# Patient Record
Sex: FEMALE | Race: WHITE | NOT HISPANIC OR LATINO | URBAN - METROPOLITAN AREA
[De-identification: names, ages, dates, MRNs, and addresses within clinical notes are randomized per-mention and may not be internally consistent; named-entity substitution may affect disease eponyms.]

---

## 2018-12-03 ENCOUNTER — APPOINTMENT (RX ONLY)
Dept: URBAN - METROPOLITAN AREA CLINIC 44 | Facility: CLINIC | Age: 23
Setting detail: DERMATOLOGY
End: 2018-12-03

## 2018-12-03 ENCOUNTER — APPOINTMENT (RX ONLY)
Dept: URBAN - METROPOLITAN AREA CLINIC 45 | Facility: CLINIC | Age: 23
Setting detail: DERMATOLOGY
End: 2018-12-03

## 2018-12-03 DIAGNOSIS — L0391 CELLULITIS AND ABSCESS OF UNSPECIFIED SITES: ICD-10-CM

## 2018-12-03 DIAGNOSIS — L0390 CELLULITIS AND ABSCESS OF UNSPECIFIED SITES: ICD-10-CM

## 2018-12-03 PROBLEM — J45.909 UNSPECIFIED ASTHMA, UNCOMPLICATED: Status: ACTIVE | Noted: 2018-12-03

## 2018-12-03 PROBLEM — L02.01 CUTANEOUS ABSCESS OF FACE: Status: ACTIVE | Noted: 2018-12-03

## 2018-12-03 PROBLEM — F41.9 ANXIETY DISORDER, UNSPECIFIED: Status: ACTIVE | Noted: 2018-12-03

## 2018-12-03 PROBLEM — L70.0 ACNE VULGARIS: Status: ACTIVE | Noted: 2018-12-03

## 2018-12-03 PROCEDURE — ? ADDITIONAL NOTES

## 2018-12-03 PROCEDURE — ? COUNSELING

## 2018-12-03 PROCEDURE — 99213 OFFICE O/P EST LOW 20 MIN: CPT

## 2018-12-03 ASSESSMENT — SEVERITY ASSESSMENT
SEVERITY: SEVERE
SEVERITY: SEVERE

## 2018-12-03 ASSESSMENT — LOCATION DETAILED DESCRIPTION DERM
LOCATION DETAILED: RIGHT CENTRAL BUCCAL CHEEK
LOCATION DETAILED: RIGHT CENTRAL BUCCAL CHEEK

## 2018-12-03 ASSESSMENT — LOCATION SIMPLE DESCRIPTION DERM
LOCATION SIMPLE: RIGHT CHEEK
LOCATION SIMPLE: RIGHT CHEEK

## 2018-12-03 ASSESSMENT — LOCATION ZONE DERM
LOCATION ZONE: FACE
LOCATION ZONE: FACE

## 2018-12-03 NOTE — HPI: SKIN LESIONS
treated_been_treated
Is This A New Presentation, Or A Follow-Up?: Growth
How Severe Is Your Skin Lesion?: mild
Additional History: Pt. States she seen like a core piece in the area a month ago and she squeezed it out but recently 3 days ago she woke up ams face was swollen, she states she is treating with doxycycline 100 mg BId for two days.

## 2018-12-03 NOTE — PROCEDURE: MIPS QUALITY
Quality 110: Preventive Care And Screening: Influenza Immunization: Influenza Immunization Administered during Influenza season
Quality 130: Documentation Of Current Medications In The Medical Record: Current Medications Documented
Quality 431: Preventive Care And Screening: Unhealthy Alcohol Use - Screening: Patient screened for unhealthy alcohol use using a single question and scores less than 2 times per year
Detail Level: Detailed
Quality 226: Preventive Care And Screening: Tobacco Use: Screening And Cessation Intervention: Patient screened for tobacco use, is a smoker AND received Cessation Counseling

## 2018-12-03 NOTE — HPI: SKIN LESIONS
How Severe Is Your Skin Lesion?: mild
treated_been_treated
Is This A New Presentation, Or A Follow-Up?: Growth
Additional History: Pt. States she seen like a core piece in the area a month ago and she squeezed it out but recently 3 days ago she woke up ams face was swollen, she states she is treating with doxycycline 100 mg BId for two days.

## 2018-12-03 NOTE — PROCEDURE: ADDITIONAL NOTES
Additional Notes: Abscess with cellulitis of R mandibular skin x 3d.  Denies fever, chills. \\n Denies dysphagia.  Reports anxiety.  On oral DOXY.  \\n\\nExquisitely tender to palpation of R chin/R jawline.  Patient tearful today.  No apparent dental source, but some apparent trismus.\\n\\nRec consider I&D with appropriate pain management (which I cannot provide here) +/- parenteral ABx.  Rec f/u with ER for further treatment.
Detail Level: Simple

## 2018-12-03 NOTE — PROCEDURE: ADDITIONAL NOTES
Detail Level: Simple
Additional Notes: Abscess with cellulitis of R mandibular skin x 3d.  Denies fever, chills. \\n Denies dysphagia.  Reports anxiety.  On oral DOXY.  \\n\\nExquisitely tender to palpation of R chin/R jawline.  Patient tearful today.  No apparent dental source, but some apparent trismus.\\n\\nRec consider I&D with appropriate pain management (which I cannot provide here) +/- parenteral ABx.  Rec f/u with ER for further treatment.

## 2018-12-03 NOTE — PROCEDURE: MIPS QUALITY
Quality 130: Documentation Of Current Medications In The Medical Record: Current Medications Documented
Detail Level: Detailed
Quality 431: Preventive Care And Screening: Unhealthy Alcohol Use - Screening: Patient screened for unhealthy alcohol use using a single question and scores less than 2 times per year
Quality 226: Preventive Care And Screening: Tobacco Use: Screening And Cessation Intervention: Patient screened for tobacco use, is a smoker AND received Cessation Counseling
Quality 110: Preventive Care And Screening: Influenza Immunization: Influenza Immunization Administered during Influenza season